# Patient Record
Sex: FEMALE | Race: WHITE | Employment: FULL TIME | URBAN - NONMETROPOLITAN AREA
[De-identification: names, ages, dates, MRNs, and addresses within clinical notes are randomized per-mention and may not be internally consistent; named-entity substitution may affect disease eponyms.]

---

## 2017-08-11 ENCOUNTER — APPOINTMENT (OUTPATIENT)
Dept: CT IMAGING | Age: 22
End: 2017-08-11
Payer: COMMERCIAL

## 2017-08-11 ENCOUNTER — ANESTHESIA EVENT (OUTPATIENT)
Dept: OPERATING ROOM | Age: 22
End: 2017-08-11
Payer: COMMERCIAL

## 2017-08-11 ENCOUNTER — HOSPITAL ENCOUNTER (OUTPATIENT)
Age: 22
Setting detail: OBSERVATION
Discharge: HOME | End: 2017-08-12
Attending: FAMILY MEDICINE | Admitting: SURGERY
Payer: COMMERCIAL

## 2017-08-11 ENCOUNTER — ANESTHESIA (OUTPATIENT)
Dept: OPERATING ROOM | Age: 22
End: 2017-08-11
Payer: COMMERCIAL

## 2017-08-11 VITALS
SYSTOLIC BLOOD PRESSURE: 102 MMHG | RESPIRATION RATE: 34 BRPM | OXYGEN SATURATION: 100 % | DIASTOLIC BLOOD PRESSURE: 59 MMHG

## 2017-08-11 DIAGNOSIS — K35.30 ACUTE APPENDICITIS WITH LOCALIZED PERITONITIS: Primary | ICD-10-CM

## 2017-08-11 LAB
ALBUMIN SERPL-MCNC: 3.8 GM/DL (ref 3.5–5)
ALP BLD-CCNC: 75 U/L (ref 46–116)
ALT SERPL-CCNC: 14 U/L (ref 12–78)
AMORPHOUS: ABNORMAL
ANION GAP: 8 MEQ/L (ref 8–16)
AST SERPL-CCNC: 10 U/L (ref 15–37)
BACTERIA: ABNORMAL
BASOPHILS # BLD: 0.7 % (ref 0–3)
BILIRUB SERPL-MCNC: 0.4 MG/DL (ref 0.2–1)
BILIRUBIN URINE: NEGATIVE
BLOOD, URINE: ABNORMAL
BUN BLDV-MCNC: 11 MG/DL (ref 7–18)
CASTS UA: ABNORMAL /LPF
CHARACTER, URINE: CLEAR
CHLORIDE BLD-SCNC: 106 MEQ/L (ref 98–107)
CO2: 24 MEQ/L (ref 21–32)
COLOR: COLORLESS
CREAT SERPL-MCNC: 0.8 MG/DL (ref 0.6–1.1)
CRYSTALS, UA: ABNORMAL
EOSINOPHILS RELATIVE PERCENT: 1.3 % (ref 0–4)
EPITHELIAL CELLS, UA: ABNORMAL /HPF
GFR, ESTIMATED: > 90 ML/MIN/1.73M2
GLUCOSE BLD-MCNC: 100 MG/DL (ref 74–106)
GLUCOSE, URINE: NEGATIVE MG/DL
HCT VFR BLD CALC: 41.1 % (ref 37–47)
HEMOGLOBIN: 13 GM/DL (ref 12–16)
KETONES, URINE: NEGATIVE
LEUKOCYTE ESTERASE, URINE: NEGATIVE
LIPASE: 89 U/L (ref 65–230)
LYMPHOCYTES # BLD: 20.6 % (ref 15–47)
MCH RBC QN AUTO: 26.8 PG (ref 27–31)
MCHC RBC AUTO-ENTMCNC: 31.6 GM/DL (ref 33–37)
MCV RBC AUTO: 84.8 FL (ref 81–99)
MONOCYTES: 7.2 % (ref 0–12)
MUCUS: ABNORMAL
NITRITE, URINE: NEGATIVE
PDW BLD-RTO: 13 % (ref 11.5–14.5)
PH UA: 5.5 (ref 5–9)
PLATELET # BLD: 274 THOU/MM3 (ref 130–400)
PLATELET ESTIMATE: ABNORMAL
PMV BLD AUTO: 7.7 MCM (ref 7.4–10.4)
POC CALCIUM: 8.9 MG/DL (ref 8.5–10.1)
POTASSIUM SERPL-SCNC: 3.8 MEQ/L (ref 3.5–5.1)
PREGNANCY, URINE: NEGATIVE
PROTEIN UA: NEGATIVE MG/DL
RBC # BLD: 4.85 MILL/MM3 (ref 4.2–5.4)
RBC UA: ABNORMAL /HPF
REFLEX TO URINE C & S: ABNORMAL
SEGS: 70.2 % (ref 43–75)
SODIUM BLD-SCNC: 138 MEQ/L (ref 136–145)
SPECIFIC GRAVITY UA: < 1.005 (ref 1–1.03)
TOTAL PROTEIN: 7.4 GM/DL (ref 6.4–8.2)
UROBILINOGEN, URINE: 0.2 EU/DL (ref 0–1)
WBC # BLD: 9.8 THOU/MM3 (ref 4.8–10.8)
WBC UA: ABNORMAL /HPF

## 2017-08-11 PROCEDURE — 7100000001 HC PACU RECOVERY - ADDTL 15 MIN: Performed by: SURGERY

## 2017-08-11 PROCEDURE — 96374 THER/PROPH/DIAG INJ IV PUSH: CPT

## 2017-08-11 PROCEDURE — 96375 TX/PRO/DX INJ NEW DRUG ADDON: CPT

## 2017-08-11 PROCEDURE — 88304 TISSUE EXAM BY PATHOLOGIST: CPT

## 2017-08-11 PROCEDURE — 96365 THER/PROPH/DIAG IV INF INIT: CPT

## 2017-08-11 PROCEDURE — 99285 EMERGENCY DEPT VISIT HI MDM: CPT

## 2017-08-11 PROCEDURE — G0378 HOSPITAL OBSERVATION PER HR: HCPCS

## 2017-08-11 PROCEDURE — 3600000013 HC SURGERY LEVEL 3 ADDTL 15MIN: Performed by: SURGERY

## 2017-08-11 PROCEDURE — 2720000010 HC SURG SUPPLY STERILE: Performed by: SURGERY

## 2017-08-11 PROCEDURE — 83690 ASSAY OF LIPASE: CPT

## 2017-08-11 PROCEDURE — 7100000000 HC PACU RECOVERY - FIRST 15 MIN: Performed by: SURGERY

## 2017-08-11 PROCEDURE — A6447 CONFORM BAND S W >=5"/YD: HCPCS

## 2017-08-11 PROCEDURE — 6360000002 HC RX W HCPCS: Performed by: NURSE ANESTHETIST, CERTIFIED REGISTERED

## 2017-08-11 PROCEDURE — 6370000000 HC RX 637 (ALT 250 FOR IP): Performed by: SURGERY

## 2017-08-11 PROCEDURE — 2580000003 HC RX 258: Performed by: FAMILY MEDICINE

## 2017-08-11 PROCEDURE — 84703 CHORIONIC GONADOTROPIN ASSAY: CPT

## 2017-08-11 PROCEDURE — 6360000002 HC RX W HCPCS: Performed by: SURGERY

## 2017-08-11 PROCEDURE — 44970 LAPAROSCOPY APPENDECTOMY: CPT | Performed by: SURGERY

## 2017-08-11 PROCEDURE — 2720000003 HC MISC SUTURE/STAPLES/RELOADS/ETC: Performed by: SURGERY

## 2017-08-11 PROCEDURE — 99219 PR INITIAL OBSERVATION CARE/DAY 50 MINUTES: CPT | Performed by: SURGERY

## 2017-08-11 PROCEDURE — 74177 CT ABD & PELVIS W/CONTRAST: CPT

## 2017-08-11 PROCEDURE — 3600000003 HC SURGERY LEVEL 3 BASE: Performed by: SURGERY

## 2017-08-11 PROCEDURE — 96361 HYDRATE IV INFUSION ADD-ON: CPT

## 2017-08-11 PROCEDURE — 2500000003 HC RX 250 WO HCPCS: Performed by: NURSE ANESTHETIST, CERTIFIED REGISTERED

## 2017-08-11 PROCEDURE — 96376 TX/PRO/DX INJ SAME DRUG ADON: CPT

## 2017-08-11 PROCEDURE — 85025 COMPLETE CBC W/AUTO DIFF WBC: CPT

## 2017-08-11 PROCEDURE — 3700000001 HC ADD 15 MINUTES (ANESTHESIA): Performed by: SURGERY

## 2017-08-11 PROCEDURE — 6360000004 HC RX CONTRAST MEDICATION: Performed by: FAMILY MEDICINE

## 2017-08-11 PROCEDURE — 96366 THER/PROPH/DIAG IV INF ADDON: CPT

## 2017-08-11 PROCEDURE — 2500000003 HC RX 250 WO HCPCS: Performed by: SURGERY

## 2017-08-11 PROCEDURE — 81001 URINALYSIS AUTO W/SCOPE: CPT

## 2017-08-11 PROCEDURE — 36415 COLL VENOUS BLD VENIPUNCTURE: CPT

## 2017-08-11 PROCEDURE — 2580000003 HC RX 258: Performed by: SURGERY

## 2017-08-11 PROCEDURE — 6360000002 HC RX W HCPCS

## 2017-08-11 PROCEDURE — 80053 COMPREHEN METABOLIC PANEL: CPT

## 2017-08-11 PROCEDURE — 3700000000 HC ANESTHESIA ATTENDED CARE: Performed by: SURGERY

## 2017-08-11 RX ORDER — FENTANYL CITRATE 50 UG/ML
25 INJECTION, SOLUTION INTRAMUSCULAR; INTRAVENOUS EVERY 5 MIN PRN
Status: DISCONTINUED | OUTPATIENT
Start: 2017-08-11 | End: 2017-08-11 | Stop reason: HOSPADM

## 2017-08-11 RX ORDER — SODIUM CHLORIDE 0.9 % (FLUSH) 0.9 %
10 SYRINGE (ML) INJECTION PRN
Status: DISCONTINUED | OUTPATIENT
Start: 2017-08-11 | End: 2017-08-11 | Stop reason: SDUPTHER

## 2017-08-11 RX ORDER — HYDROMORPHONE HCL 110MG/55ML
PATIENT CONTROLLED ANALGESIA SYRINGE INTRAVENOUS PRN
Status: DISCONTINUED | OUTPATIENT
Start: 2017-08-11 | End: 2017-08-11 | Stop reason: SDUPTHER

## 2017-08-11 RX ORDER — LABETALOL HYDROCHLORIDE 5 MG/ML
5 INJECTION, SOLUTION INTRAVENOUS EVERY 10 MIN PRN
Status: DISCONTINUED | OUTPATIENT
Start: 2017-08-11 | End: 2017-08-11 | Stop reason: HOSPADM

## 2017-08-11 RX ORDER — 0.9 % SODIUM CHLORIDE 0.9 %
1000 INTRAVENOUS SOLUTION INTRAVENOUS ONCE
Status: COMPLETED | OUTPATIENT
Start: 2017-08-11 | End: 2017-08-11

## 2017-08-11 RX ORDER — NEOSTIGMINE METHYLSULFATE 1 MG/ML
INJECTION, SOLUTION INTRAVENOUS PRN
Status: DISCONTINUED | OUTPATIENT
Start: 2017-08-11 | End: 2017-08-11 | Stop reason: SDUPTHER

## 2017-08-11 RX ORDER — LIDOCAINE HYDROCHLORIDE 20 MG/ML
INJECTION, SOLUTION INFILTRATION; PERINEURAL PRN
Status: DISCONTINUED | OUTPATIENT
Start: 2017-08-11 | End: 2017-08-11 | Stop reason: SDUPTHER

## 2017-08-11 RX ORDER — DIPHENHYDRAMINE HYDROCHLORIDE 50 MG/ML
12.5 INJECTION INTRAMUSCULAR; INTRAVENOUS
Status: DISCONTINUED | OUTPATIENT
Start: 2017-08-11 | End: 2017-08-11 | Stop reason: HOSPADM

## 2017-08-11 RX ORDER — PROMETHAZINE HYDROCHLORIDE 25 MG/ML
12.5 INJECTION, SOLUTION INTRAMUSCULAR; INTRAVENOUS
Status: DISCONTINUED | OUTPATIENT
Start: 2017-08-11 | End: 2017-08-11 | Stop reason: HOSPADM

## 2017-08-11 RX ORDER — DEXAMETHASONE SODIUM PHOSPHATE 4 MG/ML
INJECTION, SOLUTION INTRA-ARTICULAR; INTRALESIONAL; INTRAMUSCULAR; INTRAVENOUS; SOFT TISSUE PRN
Status: DISCONTINUED | OUTPATIENT
Start: 2017-08-11 | End: 2017-08-11 | Stop reason: SDUPTHER

## 2017-08-11 RX ORDER — OXYCODONE HYDROCHLORIDE AND ACETAMINOPHEN 5; 325 MG/1; MG/1
2 TABLET ORAL EVERY 4 HOURS PRN
Status: DISCONTINUED | OUTPATIENT
Start: 2017-08-11 | End: 2017-08-12 | Stop reason: HOSPADM

## 2017-08-11 RX ORDER — SUCCINYLCHOLINE CHLORIDE 20 MG/ML
INJECTION INTRAMUSCULAR; INTRAVENOUS PRN
Status: DISCONTINUED | OUTPATIENT
Start: 2017-08-11 | End: 2017-08-11 | Stop reason: SDUPTHER

## 2017-08-11 RX ORDER — FENTANYL CITRATE 50 UG/ML
50 INJECTION, SOLUTION INTRAMUSCULAR; INTRAVENOUS EVERY 5 MIN PRN
Status: DISCONTINUED | OUTPATIENT
Start: 2017-08-11 | End: 2017-08-11 | Stop reason: HOSPADM

## 2017-08-11 RX ORDER — BUPIVACAINE HYDROCHLORIDE AND EPINEPHRINE 5; 5 MG/ML; UG/ML
INJECTION, SOLUTION EPIDURAL; INTRACAUDAL; PERINEURAL PRN
Status: DISCONTINUED | OUTPATIENT
Start: 2017-08-11 | End: 2017-08-11 | Stop reason: HOSPADM

## 2017-08-11 RX ORDER — SODIUM CHLORIDE 0.9 % (FLUSH) 0.9 %
10 SYRINGE (ML) INJECTION PRN
Status: DISCONTINUED | OUTPATIENT
Start: 2017-08-11 | End: 2017-08-12 | Stop reason: HOSPADM

## 2017-08-11 RX ORDER — ONDANSETRON 2 MG/ML
INJECTION INTRAMUSCULAR; INTRAVENOUS PRN
Status: DISCONTINUED | OUTPATIENT
Start: 2017-08-11 | End: 2017-08-11 | Stop reason: SDUPTHER

## 2017-08-11 RX ORDER — SODIUM CHLORIDE 0.9 % (FLUSH) 0.9 %
10 SYRINGE (ML) INJECTION EVERY 12 HOURS SCHEDULED
Status: DISCONTINUED | OUTPATIENT
Start: 2017-08-11 | End: 2017-08-11 | Stop reason: SDUPTHER

## 2017-08-11 RX ORDER — MIDAZOLAM HYDROCHLORIDE 1 MG/ML
INJECTION INTRAMUSCULAR; INTRAVENOUS PRN
Status: DISCONTINUED | OUTPATIENT
Start: 2017-08-11 | End: 2017-08-11 | Stop reason: SDUPTHER

## 2017-08-11 RX ORDER — ONDANSETRON 2 MG/ML
4 INJECTION INTRAMUSCULAR; INTRAVENOUS EVERY 6 HOURS PRN
Status: DISCONTINUED | OUTPATIENT
Start: 2017-08-11 | End: 2017-08-11 | Stop reason: SDUPTHER

## 2017-08-11 RX ORDER — FENTANYL CITRATE 50 UG/ML
INJECTION, SOLUTION INTRAMUSCULAR; INTRAVENOUS PRN
Status: DISCONTINUED | OUTPATIENT
Start: 2017-08-11 | End: 2017-08-11 | Stop reason: SDUPTHER

## 2017-08-11 RX ORDER — GLYCOPYRROLATE 0.2 MG/ML
INJECTION INTRAMUSCULAR; INTRAVENOUS PRN
Status: DISCONTINUED | OUTPATIENT
Start: 2017-08-11 | End: 2017-08-11 | Stop reason: SDUPTHER

## 2017-08-11 RX ORDER — MEPERIDINE HYDROCHLORIDE 25 MG/ML
25 INJECTION INTRAMUSCULAR; INTRAVENOUS; SUBCUTANEOUS
Status: DISCONTINUED | OUTPATIENT
Start: 2017-08-11 | End: 2017-08-11 | Stop reason: HOSPADM

## 2017-08-11 RX ORDER — SODIUM CHLORIDE 0.9 % (FLUSH) 0.9 %
10 SYRINGE (ML) INJECTION EVERY 12 HOURS SCHEDULED
Status: DISCONTINUED | OUTPATIENT
Start: 2017-08-11 | End: 2017-08-12 | Stop reason: HOSPADM

## 2017-08-11 RX ORDER — OXYCODONE HYDROCHLORIDE AND ACETAMINOPHEN 5; 325 MG/1; MG/1
1 TABLET ORAL EVERY 4 HOURS PRN
Status: DISCONTINUED | OUTPATIENT
Start: 2017-08-11 | End: 2017-08-12 | Stop reason: HOSPADM

## 2017-08-11 RX ORDER — ONDANSETRON 2 MG/ML
4 INJECTION INTRAMUSCULAR; INTRAVENOUS EVERY 6 HOURS PRN
Status: DISCONTINUED | OUTPATIENT
Start: 2017-08-11 | End: 2017-08-12 | Stop reason: HOSPADM

## 2017-08-11 RX ORDER — ONDANSETRON 2 MG/ML
INJECTION INTRAMUSCULAR; INTRAVENOUS
Status: COMPLETED
Start: 2017-08-11 | End: 2017-08-11

## 2017-08-11 RX ORDER — ROCURONIUM BROMIDE 10 MG/ML
INJECTION, SOLUTION INTRAVENOUS PRN
Status: DISCONTINUED | OUTPATIENT
Start: 2017-08-11 | End: 2017-08-11 | Stop reason: SDUPTHER

## 2017-08-11 RX ORDER — ACETAMINOPHEN 325 MG/1
650 TABLET ORAL EVERY 4 HOURS PRN
Status: DISCONTINUED | OUTPATIENT
Start: 2017-08-11 | End: 2017-08-12 | Stop reason: HOSPADM

## 2017-08-11 RX ORDER — ONDANSETRON 2 MG/ML
4 INJECTION INTRAMUSCULAR; INTRAVENOUS ONCE
Status: COMPLETED | OUTPATIENT
Start: 2017-08-11 | End: 2017-08-11

## 2017-08-11 RX ORDER — PROPOFOL 10 MG/ML
INJECTION, EMULSION INTRAVENOUS PRN
Status: DISCONTINUED | OUTPATIENT
Start: 2017-08-11 | End: 2017-08-11 | Stop reason: SDUPTHER

## 2017-08-11 RX ORDER — ACETAMINOPHEN 325 MG/1
650 TABLET ORAL EVERY 4 HOURS PRN
Status: DISCONTINUED | OUTPATIENT
Start: 2017-08-11 | End: 2017-08-11 | Stop reason: SDUPTHER

## 2017-08-11 RX ORDER — SODIUM CHLORIDE 9 MG/ML
INJECTION, SOLUTION INTRAVENOUS CONTINUOUS
Status: DISCONTINUED | OUTPATIENT
Start: 2017-08-11 | End: 2017-08-12

## 2017-08-11 RX ADMIN — FENTANYL CITRATE 100 MCG: 50 INJECTION INTRAMUSCULAR; INTRAVENOUS at 10:00

## 2017-08-11 RX ADMIN — HYDROMORPHONE HYDROCHLORIDE 0.25 MG: 1 INJECTION, SOLUTION INTRAMUSCULAR; INTRAVENOUS; SUBCUTANEOUS at 16:21

## 2017-08-11 RX ADMIN — ONDANSETRON 4 MG: 2 INJECTION INTRAMUSCULAR; INTRAVENOUS at 10:07

## 2017-08-11 RX ADMIN — SODIUM CHLORIDE 1000 ML: 9 INJECTION, SOLUTION INTRAVENOUS at 02:35

## 2017-08-11 RX ADMIN — NEOSTIGMINE METHYLSULFATE 3 MG: 1 INJECTION, SOLUTION INTRAVENOUS at 10:28

## 2017-08-11 RX ADMIN — IOPAMIDOL 100 ML: 755 INJECTION, SOLUTION INTRAVENOUS at 03:56

## 2017-08-11 RX ADMIN — DEXAMETHASONE SODIUM PHOSPHATE 8 MG: 4 INJECTION, SOLUTION INTRAMUSCULAR; INTRAVENOUS at 10:07

## 2017-08-11 RX ADMIN — OXYCODONE HYDROCHLORIDE AND ACETAMINOPHEN 2 TABLET: 5; 325 TABLET ORAL at 17:58

## 2017-08-11 RX ADMIN — SUCCINYLCHOLINE CHLORIDE 100 MG: 20 INJECTION, SOLUTION INTRAMUSCULAR; INTRAVENOUS at 10:00

## 2017-08-11 RX ADMIN — GLYCOPYRROLATE 0.4 MG: 0.2 INJECTION, SOLUTION INTRAMUSCULAR; INTRAVENOUS at 10:28

## 2017-08-11 RX ADMIN — MIDAZOLAM HYDROCHLORIDE 2 MG: 1 INJECTION INTRAMUSCULAR; INTRAVENOUS at 09:47

## 2017-08-11 RX ADMIN — HYDROMORPHONE HYDROCHLORIDE 0.5 MG: 1 INJECTION, SOLUTION INTRAMUSCULAR; INTRAVENOUS; SUBCUTANEOUS at 08:24

## 2017-08-11 RX ADMIN — ONDANSETRON 4 MG: 2 INJECTION INTRAMUSCULAR; INTRAVENOUS at 05:12

## 2017-08-11 RX ADMIN — PIPERACILLIN SODIUM,TAZOBACTAM SODIUM 3.38 G: 3; .375 INJECTION, POWDER, FOR SOLUTION INTRAVENOUS at 16:22

## 2017-08-11 RX ADMIN — PIPERACILLIN SODIUM,TAZOBACTAM SODIUM 3.38 G: 3; .375 INJECTION, POWDER, FOR SOLUTION INTRAVENOUS at 09:13

## 2017-08-11 RX ADMIN — GLYCOPYRROLATE 0.2 MG: 0.2 INJECTION, SOLUTION INTRAMUSCULAR; INTRAVENOUS at 10:05

## 2017-08-11 RX ADMIN — OXYCODONE HYDROCHLORIDE AND ACETAMINOPHEN 1 TABLET: 5; 325 TABLET ORAL at 22:07

## 2017-08-11 RX ADMIN — LIDOCAINE HYDROCHLORIDE 60 MG: 20 INJECTION, SOLUTION INFILTRATION; PERINEURAL at 10:00

## 2017-08-11 RX ADMIN — SODIUM CHLORIDE: 9 INJECTION, SOLUTION INTRAVENOUS at 12:50

## 2017-08-11 RX ADMIN — HYDROMORPHONE HYDROCHLORIDE 1 MG: 2 INJECTION INTRAMUSCULAR; INTRAVENOUS; SUBCUTANEOUS at 10:26

## 2017-08-11 RX ADMIN — PROPOFOL 200 MG: 10 INJECTION, EMULSION INTRAVENOUS at 10:00

## 2017-08-11 RX ADMIN — Medication 30 MG: at 10:05

## 2017-08-11 RX ADMIN — SODIUM CHLORIDE: 9 INJECTION, SOLUTION INTRAVENOUS at 08:20

## 2017-08-11 RX ADMIN — HYDROMORPHONE HYDROCHLORIDE 0.25 MG: 1 INJECTION, SOLUTION INTRAMUSCULAR; INTRAVENOUS; SUBCUTANEOUS at 12:48

## 2017-08-11 ASSESSMENT — PAIN SCALES - GENERAL
PAINLEVEL_OUTOF10: 5
PAINLEVEL_OUTOF10: 5
PAINLEVEL_OUTOF10: 3
PAINLEVEL_OUTOF10: 5
PAINLEVEL_OUTOF10: 0
PAINLEVEL_OUTOF10: 6
PAINLEVEL_OUTOF10: 5
PAINLEVEL_OUTOF10: 6
PAINLEVEL_OUTOF10: 5
PAINLEVEL_OUTOF10: 7
PAINLEVEL_OUTOF10: 5
PAINLEVEL_OUTOF10: 3
PAINLEVEL_OUTOF10: 0
PAINLEVEL_OUTOF10: 5
PAINLEVEL_OUTOF10: 0
PAINLEVEL_OUTOF10: 6
PAINLEVEL_OUTOF10: 5
PAINLEVEL_OUTOF10: 4
PAINLEVEL_OUTOF10: 8
PAINLEVEL_OUTOF10: 6

## 2017-08-11 ASSESSMENT — PAIN DESCRIPTION - PAIN TYPE
TYPE: ACUTE PAIN
TYPE: SURGICAL PAIN
TYPE: SURGICAL PAIN
TYPE: ACUTE PAIN
TYPE: SURGICAL PAIN
TYPE: ACUTE PAIN

## 2017-08-11 ASSESSMENT — ENCOUNTER SYMPTOMS
SORE THROAT: 0
WHEEZING: 0
EYE DISCHARGE: 0
COLOR CHANGE: 0
CHEST TIGHTNESS: 0
EYE REDNESS: 0
ABDOMINAL DISTENTION: 0
STRIDOR: 0
PHOTOPHOBIA: 0
SHORTNESS OF BREATH: 0
CONSTIPATION: 0
VOMITING: 0
COUGH: 0
RHINORRHEA: 0
VOICE CHANGE: 0
EYE PAIN: 0
BACK PAIN: 0
FACIAL SWELLING: 0
DIARRHEA: 0
ABDOMINAL PAIN: 1
NAUSEA: 1

## 2017-08-11 ASSESSMENT — PULMONARY FUNCTION TESTS
PIF_VALUE: 15
PIF_VALUE: 21
PIF_VALUE: 0
PIF_VALUE: 20
PIF_VALUE: 15
PIF_VALUE: 15
PIF_VALUE: 14
PIF_VALUE: 14
PIF_VALUE: 0
PIF_VALUE: 27
PIF_VALUE: 14
PIF_VALUE: 24
PIF_VALUE: 3
PIF_VALUE: 15
PIF_VALUE: 15
PIF_VALUE: 0
PIF_VALUE: 3
PIF_VALUE: 22
PIF_VALUE: 10
PIF_VALUE: 2
PIF_VALUE: 10
PIF_VALUE: 10
PIF_VALUE: 15
PIF_VALUE: 11
PIF_VALUE: 16
PIF_VALUE: 11
PIF_VALUE: 2
PIF_VALUE: 11
PIF_VALUE: 0
PIF_VALUE: 0
PIF_VALUE: 23
PIF_VALUE: 19
PIF_VALUE: 22
PIF_VALUE: 15
PIF_VALUE: 0
PIF_VALUE: 21
PIF_VALUE: 15
PIF_VALUE: 18
PIF_VALUE: 14
PIF_VALUE: 11
PIF_VALUE: 21
PIF_VALUE: 24
PIF_VALUE: 22
PIF_VALUE: 10
PIF_VALUE: 7
PIF_VALUE: 7
PIF_VALUE: 11
PIF_VALUE: 21
PIF_VALUE: 21

## 2017-08-11 ASSESSMENT — PAIN DESCRIPTION - LOCATION
LOCATION: ABDOMEN

## 2017-08-11 ASSESSMENT — PAIN DESCRIPTION - DESCRIPTORS
DESCRIPTORS: ACHING

## 2017-08-11 ASSESSMENT — PAIN DESCRIPTION - ONSET
ONSET: AWAKENED FROM SLEEP
ONSET: ON-GOING

## 2017-08-11 ASSESSMENT — PAIN SCALES - WONG BAKER: WONGBAKER_NUMERICALRESPONSE: 0

## 2017-08-11 ASSESSMENT — PAIN DESCRIPTION - ORIENTATION
ORIENTATION: RIGHT
ORIENTATION: RIGHT;MID;LOWER
ORIENTATION: RIGHT;MID

## 2017-08-11 ASSESSMENT — PAIN DESCRIPTION - PROGRESSION
CLINICAL_PROGRESSION: NOT CHANGED
CLINICAL_PROGRESSION: NOT CHANGED

## 2017-08-11 ASSESSMENT — PAIN DESCRIPTION - FREQUENCY
FREQUENCY: CONTINUOUS
FREQUENCY: INTERMITTENT
FREQUENCY: INTERMITTENT

## 2017-08-12 VITALS
BODY MASS INDEX: 28.44 KG/M2 | TEMPERATURE: 98.3 F | OXYGEN SATURATION: 98 % | DIASTOLIC BLOOD PRESSURE: 68 MMHG | HEIGHT: 67 IN | RESPIRATION RATE: 16 BRPM | WEIGHT: 181.2 LBS | HEART RATE: 72 BPM | SYSTOLIC BLOOD PRESSURE: 110 MMHG

## 2017-08-12 LAB
ANION GAP SERPL CALCULATED.3IONS-SCNC: 13 MEQ/L (ref 8–16)
BUN BLDV-MCNC: 6 MG/DL (ref 7–22)
CALCIUM SERPL-MCNC: 8.7 MG/DL (ref 8.5–10.5)
CHLORIDE BLD-SCNC: 104 MEQ/L (ref 98–111)
CO2: 23 MEQ/L (ref 23–33)
CREAT SERPL-MCNC: 0.7 MG/DL (ref 0.4–1.2)
GFR SERPL CREATININE-BSD FRML MDRD: > 90 ML/MIN/1.73M2
GLUCOSE BLD-MCNC: 112 MG/DL (ref 70–108)
HCT VFR BLD CALC: 35.9 % (ref 37–47)
HEMOGLOBIN: 12.1 GM/DL (ref 12–16)
MCH RBC QN AUTO: 27.8 PG (ref 27–31)
MCHC RBC AUTO-ENTMCNC: 33.7 GM/DL (ref 33–37)
MCV RBC AUTO: 82.4 FL (ref 81–99)
PDW BLD-RTO: 14.6 % (ref 11.5–14.5)
PLATELET # BLD: 266 THOU/MM3 (ref 130–400)
PMV BLD AUTO: 8.4 MCM (ref 7.4–10.4)
POTASSIUM SERPL-SCNC: 4.8 MEQ/L (ref 3.5–5.2)
RBC # BLD: 4.36 MILL/MM3 (ref 4.2–5.4)
SODIUM BLD-SCNC: 140 MEQ/L (ref 135–145)
WBC # BLD: 9.6 THOU/MM3 (ref 4.8–10.8)

## 2017-08-12 PROCEDURE — 6360000002 HC RX W HCPCS: Performed by: SURGERY

## 2017-08-12 PROCEDURE — 36415 COLL VENOUS BLD VENIPUNCTURE: CPT

## 2017-08-12 PROCEDURE — 96366 THER/PROPH/DIAG IV INF ADDON: CPT

## 2017-08-12 PROCEDURE — 6370000000 HC RX 637 (ALT 250 FOR IP): Performed by: SURGERY

## 2017-08-12 PROCEDURE — 85027 COMPLETE CBC AUTOMATED: CPT

## 2017-08-12 PROCEDURE — 2580000003 HC RX 258: Performed by: SURGERY

## 2017-08-12 PROCEDURE — 99999 PR OFFICE/OUTPT VISIT,PROCEDURE ONLY: CPT | Performed by: NURSE PRACTITIONER

## 2017-08-12 PROCEDURE — 99024 POSTOP FOLLOW-UP VISIT: CPT | Performed by: NURSE PRACTITIONER

## 2017-08-12 PROCEDURE — G0378 HOSPITAL OBSERVATION PER HR: HCPCS

## 2017-08-12 PROCEDURE — 80048 BASIC METABOLIC PNL TOTAL CA: CPT

## 2017-08-12 RX ORDER — OXYCODONE HYDROCHLORIDE AND ACETAMINOPHEN 5; 325 MG/1; MG/1
1 TABLET ORAL EVERY 4 HOURS PRN
Qty: 20 TABLET | Refills: 0 | Status: SHIPPED | OUTPATIENT
Start: 2017-08-12 | End: 2017-08-19

## 2017-08-12 RX ADMIN — OXYCODONE HYDROCHLORIDE AND ACETAMINOPHEN 1 TABLET: 5; 325 TABLET ORAL at 13:27

## 2017-08-12 RX ADMIN — PIPERACILLIN SODIUM,TAZOBACTAM SODIUM 3.38 G: 3; .375 INJECTION, POWDER, FOR SOLUTION INTRAVENOUS at 09:09

## 2017-08-12 RX ADMIN — PIPERACILLIN SODIUM,TAZOBACTAM SODIUM 3.38 G: 3; .375 INJECTION, POWDER, FOR SOLUTION INTRAVENOUS at 01:25

## 2017-08-12 RX ADMIN — OXYCODONE HYDROCHLORIDE AND ACETAMINOPHEN 1 TABLET: 5; 325 TABLET ORAL at 09:09

## 2017-08-12 RX ADMIN — SODIUM CHLORIDE: 9 INJECTION, SOLUTION INTRAVENOUS at 01:25

## 2017-08-12 ASSESSMENT — PAIN SCALES - GENERAL
PAINLEVEL_OUTOF10: 0
PAINLEVEL_OUTOF10: 1
PAINLEVEL_OUTOF10: 5
PAINLEVEL_OUTOF10: 4
PAINLEVEL_OUTOF10: 4
PAINLEVEL_OUTOF10: 5

## 2017-08-12 ASSESSMENT — PAIN DESCRIPTION - DESCRIPTORS
DESCRIPTORS: ACHING;SORE
DESCRIPTORS: ACHING;SORE

## 2017-08-12 ASSESSMENT — PAIN DESCRIPTION - ONSET
ONSET: ON-GOING
ONSET: ON-GOING

## 2017-08-12 ASSESSMENT — PAIN DESCRIPTION - ORIENTATION
ORIENTATION: RIGHT;LOWER;MID
ORIENTATION: RIGHT;MID;LOWER

## 2017-08-12 ASSESSMENT — PAIN DESCRIPTION - PROGRESSION
CLINICAL_PROGRESSION: RAPIDLY IMPROVING
CLINICAL_PROGRESSION: GRADUALLY IMPROVING

## 2017-08-12 ASSESSMENT — PAIN DESCRIPTION - PAIN TYPE
TYPE: SURGICAL PAIN
TYPE: SURGICAL PAIN

## 2017-08-12 ASSESSMENT — PAIN DESCRIPTION - LOCATION
LOCATION: ABDOMEN
LOCATION: ABDOMEN

## 2017-08-12 ASSESSMENT — PAIN DESCRIPTION - FREQUENCY
FREQUENCY: CONTINUOUS
FREQUENCY: CONTINUOUS

## 2017-08-30 ENCOUNTER — OFFICE VISIT (OUTPATIENT)
Dept: SURGERY | Age: 22
End: 2017-08-30

## 2017-08-30 VITALS
DIASTOLIC BLOOD PRESSURE: 58 MMHG | BODY MASS INDEX: 27.64 KG/M2 | OXYGEN SATURATION: 98 % | SYSTOLIC BLOOD PRESSURE: 100 MMHG | HEART RATE: 112 BPM | HEIGHT: 67 IN | RESPIRATION RATE: 18 BRPM | WEIGHT: 176.1 LBS | TEMPERATURE: 97.3 F

## 2017-08-30 DIAGNOSIS — Z90.49 S/P LAPAROSCOPIC APPENDECTOMY: Primary | ICD-10-CM

## 2017-08-30 PROCEDURE — 99024 POSTOP FOLLOW-UP VISIT: CPT | Performed by: NURSE PRACTITIONER

## 2017-08-30 ASSESSMENT — ENCOUNTER SYMPTOMS
CHOKING: 0
DIARRHEA: 0
ABDOMINAL PAIN: 0
COUGH: 0
ANAL BLEEDING: 0
RHINORRHEA: 0
CHEST TIGHTNESS: 0
BLOOD IN STOOL: 0
EYE ITCHING: 0
SORE THROAT: 0
WHEEZING: 0
PHOTOPHOBIA: 0
EYE PAIN: 0
STRIDOR: 0
BACK PAIN: 0
NAUSEA: 0
VOICE CHANGE: 0
TROUBLE SWALLOWING: 0
CONSTIPATION: 0
COLOR CHANGE: 0
EYE DISCHARGE: 0
SHORTNESS OF BREATH: 0
SINUS PRESSURE: 0
APNEA: 0
FACIAL SWELLING: 0
RECTAL PAIN: 0
VOMITING: 0
EYE REDNESS: 0
ABDOMINAL DISTENTION: 0

## 2022-09-19 ENCOUNTER — HOSPITAL ENCOUNTER (EMERGENCY)
Age: 27
Discharge: HOME | End: 2022-09-19
Attending: EMERGENCY MEDICINE
Payer: COMMERCIAL

## 2022-09-19 ENCOUNTER — APPOINTMENT (OUTPATIENT)
Dept: GENERAL RADIOLOGY | Age: 27
End: 2022-09-19
Payer: COMMERCIAL

## 2022-09-19 VITALS
WEIGHT: 215 LBS | BODY MASS INDEX: 34.55 KG/M2 | SYSTOLIC BLOOD PRESSURE: 105 MMHG | TEMPERATURE: 98.3 F | HEART RATE: 71 BPM | DIASTOLIC BLOOD PRESSURE: 71 MMHG | HEIGHT: 66 IN | OXYGEN SATURATION: 99 % | RESPIRATION RATE: 23 BRPM

## 2022-09-19 DIAGNOSIS — R07.89 CHEST WALL PAIN: Primary | ICD-10-CM

## 2022-09-19 LAB
ABSOLUTE IMMATURE GRANULOCYTE: 0.01 THOU/MM3 (ref 0–0.07)
ALBUMIN SERPL-MCNC: 3.6 GM/DL (ref 3.4–5)
ALP BLD-CCNC: 87 U/L (ref 46–116)
ALT SERPL-CCNC: 21 U/L (ref 14–63)
ANION GAP: 8 MEQ/L (ref 8–16)
AST SERPL-CCNC: 14 U/L (ref 15–37)
BASOPHILS # BLD: 0.9 % (ref 0–3)
BASOPHILS ABSOLUTE: 0.1 THOU/MM3 (ref 0–0.1)
BILIRUB SERPL-MCNC: 0.4 MG/DL (ref 0.2–1)
BUN BLDV-MCNC: 17 MG/DL (ref 7–18)
CHLORIDE BLD-SCNC: 106 MEQ/L (ref 98–107)
CO2: 27 MEQ/L (ref 21–32)
CREAT SERPL-MCNC: 0.9 MG/DL (ref 0.6–1.3)
EOSINOPHILS ABSOLUTE: 0.2 THOU/MM3 (ref 0–0.5)
EOSINOPHILS RELATIVE PERCENT: 2.6 % (ref 0–4)
GFR, ESTIMATED: 81 ML/MIN/1.73M2
GLUCOSE BLD-MCNC: 98 MG/DL (ref 74–106)
HCT VFR BLD CALC: 40.5 % (ref 37–47)
HEMOGLOBIN: 13.7 GM/DL (ref 12–16)
IMMATURE GRANULOCYTES: 0 %
LYMPHOCYTES # BLD: 33 % (ref 15–47)
LYMPHOCYTES ABSOLUTE: 1.9 THOU/MM3 (ref 1–4.8)
MCH RBC QN AUTO: 29.8 PG (ref 26–32)
MCHC RBC AUTO-ENTMCNC: 33.8 GM/DL (ref 31–35)
MCV RBC AUTO: 88.2 FL (ref 81–99)
MONOCYTES: 0.5 THOU/MM3 (ref 0.3–1.3)
MONOCYTES: 7.8 % (ref 0–12)
NEUTROPHILS ABSOLUTE: 3.2 THOU/MM3 (ref 1.8–7.7)
PDW BLD-RTO: 12.4 % (ref 11.5–14.9)
PLATELET # BLD: 253 THOU/MM3 (ref 130–400)
PMV BLD AUTO: 9.5 FL (ref 9.4–12.4)
POC CALCIUM: 8.7 MG/DL (ref 8.5–10.1)
POTASSIUM SERPL-SCNC: 4.6 MEQ/L (ref 3.5–5.1)
RBC # BLD: 4.59 MILL/MM3 (ref 4.1–5.3)
SEGS: 55.5 % (ref 43–75)
SODIUM BLD-SCNC: 141 MEQ/L (ref 136–145)
TOTAL PROTEIN: 7.1 GM/DL (ref 6.4–8.2)
TROPONIN, HIGH SENSITIVITY: 5 PG/ML (ref 0–51.3)
WBC # BLD: 5.8 THOU/MM3 (ref 4.8–10.8)

## 2022-09-19 PROCEDURE — 71046 X-RAY EXAM CHEST 2 VIEWS: CPT

## 2022-09-19 PROCEDURE — 93005 ELECTROCARDIOGRAM TRACING: CPT | Performed by: EMERGENCY MEDICINE

## 2022-09-19 PROCEDURE — 85025 COMPLETE CBC W/AUTO DIFF WBC: CPT

## 2022-09-19 PROCEDURE — 80053 COMPREHEN METABOLIC PANEL: CPT

## 2022-09-19 PROCEDURE — 6370000000 HC RX 637 (ALT 250 FOR IP): Performed by: EMERGENCY MEDICINE

## 2022-09-19 PROCEDURE — 84484 ASSAY OF TROPONIN QUANT: CPT

## 2022-09-19 PROCEDURE — 99285 EMERGENCY DEPT VISIT HI MDM: CPT

## 2022-09-19 PROCEDURE — 36415 COLL VENOUS BLD VENIPUNCTURE: CPT

## 2022-09-19 RX ORDER — ASPIRIN 81 MG/1
324 TABLET, CHEWABLE ORAL ONCE
Status: COMPLETED | OUTPATIENT
Start: 2022-09-19 | End: 2022-09-19

## 2022-09-19 RX ORDER — IBUPROFEN 200 MG
600 TABLET ORAL ONCE
Status: COMPLETED | OUTPATIENT
Start: 2022-09-19 | End: 2022-09-19

## 2022-09-19 RX ADMIN — IBUPROFEN 600 MG: 200 TABLET, FILM COATED ORAL at 09:19

## 2022-09-19 RX ADMIN — ASPIRIN 81 MG 324 MG: 81 TABLET ORAL at 08:55

## 2022-09-19 ASSESSMENT — PAIN SCALES - GENERAL
PAINLEVEL_OUTOF10: 0
PAINLEVEL_OUTOF10: 1
PAINLEVEL_OUTOF10: 0

## 2022-09-19 ASSESSMENT — ENCOUNTER SYMPTOMS
COUGH: 0
WHEEZING: 0
HEARTBURN: 0
NAUSEA: 0
ABDOMINAL PAIN: 0
SORE THROAT: 0
SHORTNESS OF BREATH: 0

## 2022-09-19 ASSESSMENT — PAIN DESCRIPTION - ORIENTATION: ORIENTATION: MID;UPPER;LEFT

## 2022-09-19 ASSESSMENT — HEART SCORE: ECG: 0

## 2022-09-19 ASSESSMENT — PAIN DESCRIPTION - LOCATION: LOCATION: CHEST

## 2022-09-19 ASSESSMENT — PAIN - FUNCTIONAL ASSESSMENT
PAIN_FUNCTIONAL_ASSESSMENT: 0-10
PAIN_FUNCTIONAL_ASSESSMENT: NONE - DENIES PAIN

## 2022-09-19 NOTE — ED NOTES
Discharge instructions reviewed with patient and questions answered. Skin warm and dry, color normal for ethnicity. Remains alert and cooperative. Denies further questions or concerns at this time.       Katherine Cook RN  09/19/22 9098

## 2022-09-19 NOTE — ED PROVIDER NOTES
[facility]  eMERGENCY dEPARTMENT eNCOUnter             Mauro Parnell 21 COMPLAINT    Chief Complaint   Patient presents with    Chest Pain       Nurses Notes reviewed and I agree except as noted in the HPI. HPI    Humera Velásquez is a 24 y.o. female who presents stating that she woke up at about 630 this morning and noted that she had pain in the left side of her chest, going into her left arm. It went on \"for about 40 minutes \". Now the pain is gone. When she had the pain, it was not made better or worse by breathing. No preceding symptoms. She states that now, she feels fine. This is happened a few times to her in the past.  She has never had any kind of evaluation for it. She denies any recent illness, no cough or chest congestion. She is not on birth control or hormones, no history of blood clots. She denies any calf pain. No recent chest wall injury. REVIEW OF SYSTEMS      Review of Systems   Constitutional:  Negative for diaphoresis, fever and malaise/fatigue. HENT:  Negative for congestion, ear pain and sore throat. Respiratory:  Negative for cough, shortness of breath and wheezing. Cardiovascular:  Positive for chest pain. Negative for palpitations and leg swelling. Gastrointestinal:  Negative for abdominal pain, heartburn and nausea. Musculoskeletal: Negative. Skin:  Negative for rash. Neurological: Negative. All other systems reviewed and are negative. PAST MEDICAL HISTORY     has no past medical history on file. SURGICAL HISTORY     has a past surgical history that includes laparoscopic appendectomy (N/A, 8/11/2017). CURRENT MEDICATIONS    Previous Medications    No medications on file       ALLERGIES    is allergic to bactrim [sulfamethoxazole-trimethoprim] and ciprofloxacin hcl. FAMILY HISTORY    She indicated that her mother is alive. She indicated that her father is alive.  She indicated that the status of her maternal grandmother is unknown. She indicated that the status of her maternal grandfather is unknown. She indicated that the status of her paternal grandmother is unknown. She indicated that the status of her paternal grandfather is unknown.   family history includes Cancer in her paternal grandfather; Diabetes in her paternal grandmother; High Blood Pressure in her maternal grandfather, maternal grandmother, and paternal grandmother; Kidney Disease in her paternal grandfather. SOCIAL HISTORY     reports that she has never smoked. She has never used smokeless tobacco. She reports that she does not drink alcohol and does not use drugs. PHYSICAL EXAM       INITIAL VITALS: /71   Pulse 93   Temp 98.3 °F (36.8 °C) (Oral)   Resp 16   Ht 5' 6\" (1.676 m)   Wt 215 lb (97.5 kg)   SpO2 99%   BMI 34.70 kg/m²      Physical Exam  Vitals and nursing note reviewed. Constitutional:       General: She is not in acute distress. Appearance: She is not ill-appearing. HENT:      Nose: Nose normal.      Mouth/Throat:      Mouth: Mucous membranes are moist.      Pharynx: No oropharyngeal exudate or posterior oropharyngeal erythema. Eyes:      Conjunctiva/sclera: Conjunctivae normal.      Pupils: Pupils are equal, round, and reactive to light. Cardiovascular:      Rate and Rhythm: Normal rate and regular rhythm. Heart sounds: No murmur heard. Pulmonary:      Effort: Pulmonary effort is normal. No respiratory distress. Breath sounds: Normal breath sounds. No wheezing. Chest:      Chest wall: No tenderness. Abdominal:      General: Bowel sounds are normal.      Palpations: Abdomen is soft. There is no mass. Tenderness: no abdominal tenderness There is no guarding. Musculoskeletal:         General: No swelling or tenderness. Cervical back: Neck supple. No tenderness. Right lower leg: No edema. Left lower leg: No edema.    Lymphadenopathy:      Cervical: No cervical adenopathy. Skin:     General: Skin is warm and dry. Findings: No rash. Neurological:      General: No focal deficit present. Mental Status: She is alert and oriented to person, place, and time. Psychiatric:         Behavior: Behavior normal.            DIAGNOSTIC RESULTS    EKG     Normal sinus rhythm, sinus arrhythmia, otherwise normal axes, normal intervals, normal EKG. RADIOLOGY:    XR CHEST (2 VW)   Final Result   Stable radiographic appearance of the chest. No evidence of an acute process. **This report has been created using voice recognition software. It may contain minor errors which are inherent in voice recognition technology. **      Final report electronically signed by Dr. Raj Mcdonough on 9/19/2022 8:52 AM              LABS:     Labs Reviewed   COMPREHENSIVE METABOLIC PANEL - Abnormal; Notable for the following components:       Result Value    AST 14 (*)     All other components within normal limits   GLOMERULAR FILTRATION RATE, ESTIMATED - Abnormal; Notable for the following components:    GFR, Estimated 81 (*)     All other components within normal limits   CBC WITH AUTO DIFFERENTIAL   TROPONIN   ANION GAP     Heart score is 1. Vitals:    Vitals:    09/19/22 0812 09/19/22 0830 09/19/22 0856   BP: 102/70 92/78 105/71   Pulse: 63 67 93   Resp: 18 16 16   Temp: 98.3 °F (36.8 °C)     TempSrc: Oral     SpO2: 99% 99% 99%   Weight: 215 lb (97.5 kg)     Height: 5' 6\" (1.676 m)         EMERGENCY DEPARTMENT COURSE:    Oral aspirin given. No further chest pain. Test results and plan of care discussed with the patient. She will follow-up with her own physician. She does have some tenderness over chest wall over the left anterior chest.  I have asked her to take ibuprofen 600 mg every 6 hours as needed for that pain. FINAL IMPRESSION      1.  Chest wall pain        DISPOSITION/PLAN    DISPOSITION Decision To Discharge 09/19/2022 09:11:03 AM      PATIENT REFERRED TO:    KIRSTEN Tyson - CNP  [address] [facility] [address]  [phone]      As needed    DISCHARGE MEDICATIONS:    New Prescriptions    No medications on file          (Please note that portions of this note were completed with a voice recognition program.  Efforts were made to edit the dictations but occasionally words are mis-transcribed.)       Jhon Portillo MD  09/19/22 1347

## 2022-09-19 NOTE — DISCHARGE INSTRUCTIONS
Ibuprofen 600 mg every 6 hours as needed for pain. Follow-up with your doctor if symptoms persist.  You may need further testing.

## 2022-09-19 NOTE — Clinical Note
Pat Yanes was seen and treated in our emergency department on 9/19/2022. She may return to work on 09/20/2022. If you have any questions or concerns, please don't hesitate to call.       Nilam Gates MD

## 2022-09-20 LAB
EKG ATRIAL RATE: 61 BPM
EKG P AXIS: 54 DEGREES
EKG P-R INTERVAL: 142 MS
EKG Q-T INTERVAL: 418 MS
EKG QRS DURATION: 90 MS
EKG QTC CALCULATION (BAZETT): 420 MS
EKG R AXIS: 57 DEGREES
EKG T AXIS: 49 DEGREES
EKG VENTRICULAR RATE: 61 BPM

## 2022-09-20 PROCEDURE — 93010 ELECTROCARDIOGRAM REPORT: CPT | Performed by: INTERNAL MEDICINE

## 2024-12-12 ENCOUNTER — HOSPITAL ENCOUNTER (EMERGENCY)
Age: 29
Discharge: HOME | End: 2024-12-12
Payer: COMMERCIAL

## 2024-12-12 VITALS
SYSTOLIC BLOOD PRESSURE: 106 MMHG | HEART RATE: 65 BPM | TEMPERATURE: 98.5 F | RESPIRATION RATE: 16 BRPM | OXYGEN SATURATION: 99 % | DIASTOLIC BLOOD PRESSURE: 66 MMHG

## 2024-12-12 DIAGNOSIS — H66.002 NON-RECURRENT ACUTE SUPPURATIVE OTITIS MEDIA OF LEFT EAR WITHOUT SPONTANEOUS RUPTURE OF TYMPANIC MEMBRANE: Primary | ICD-10-CM

## 2024-12-12 DIAGNOSIS — R05.1 ACUTE COUGH: ICD-10-CM

## 2024-12-12 PROCEDURE — 99213 OFFICE O/P EST LOW 20 MIN: CPT

## 2024-12-12 PROCEDURE — 99203 OFFICE O/P NEW LOW 30 MIN: CPT

## 2024-12-12 RX ORDER — FLUTICASONE PROPIONATE 50 MCG
1 SPRAY, SUSPENSION (ML) NASAL DAILY PRN
Qty: 16 G | Refills: 0 | Status: SHIPPED | OUTPATIENT
Start: 2024-12-12

## 2024-12-12 RX ORDER — PREDNISONE 20 MG/1
20 TABLET ORAL 2 TIMES DAILY
Qty: 10 TABLET | Refills: 0 | Status: SHIPPED | OUTPATIENT
Start: 2024-12-12 | End: 2024-12-17

## 2024-12-12 ASSESSMENT — ENCOUNTER SYMPTOMS: COUGH: 1

## 2024-12-13 NOTE — DISCHARGE INSTRUCTIONS
Antibiotics  Steroids  Flonase  Otc cough and cold medication  Tylenol and motrin  ER if symptoms worsen.

## 2024-12-13 NOTE — ED PROVIDER NOTES
[facility] URGENT [facility]ARE  Urgent Care Encounter       CHIEF COMPLAINT       Chief Complaint   Patient presents with    Cough     \" Over a week\" and left ear pressure        Nurses Notes reviewed and I agree except as noted in the HPI.  HISTORY OF PRESENT ILLNESS   Marybeth Campbell is a 29 y.o. female who presents with complaints of a cough for over a week. Pt reports she has tried over the counter cough and cold medication without relieve. Pt reports now she has left ear pressure.     The history is provided by the patient.       REVIEW OF SYSTEMS     Review of Systems   HENT:  Positive for ear pain.    Respiratory:  Positive for cough.        PAST MEDICAL HISTORY         Diagnosis Date    S/P laparoscopic appendectomy        SURGICALHISTORY     Patient  has a past surgical history that includes laparoscopic appendectomy (N/A, 8/11/2017).    CURRENT MEDICATIONS       Previous Medications    No medications on file       ALLERGIES     Patient is is allergic to sulfamethoxazole-trimethoprim and ciprofloxacin hcl.    Patients   Immunization History   Administered Date(s) Administered    COVID-19, PFIZER PURPLE top, DILUTE for use, (age 12 y+), 30mcg/0.3mL 12/22/2021, 01/12/2022    DTaP vaccine 1997, 1997, 01/20/1998, 08/11/1998    DTaP, DAPTACEL, (age 6w-6y), IM, 0.5mL 06/13/2002    Hep A, HAVRIX, VAQTA, (age 12m-18y), IM, 0.5mL 07/27/2015, 03/18/2020    Hep B, ENGERIX-B, RECOMBIVAX-HB, (age Birth - 19y), IM, 0.5mL 01/25/1999, 03/08/1999, 08/23/1999    Hib vaccine 1997, 1997, 01/20/1998, 07/07/1998    MMR, PRIORIX, M-M-R II, (age 12m+), SC, 0.5mL 07/07/1998, 06/13/2002    Meningococcal ACWY, MENACTRA (MenACWY-D), (age 9m-55y), IM, 0.5mL 07/27/2015    Meningococcal B, TRUMENBA, (age 10y-25y), IM, 0.5mL 03/18/2020    Polio OPV 07/07/1998    Poliovirus, IPOL, (age 6w+), SC/IM, 0.5mL 1997, 1997, 06/13/2002    TDaP, ADACEL (age 10y-64y), BOOSTRIX (age 10y+), IM, 0.5mL

## 2025-06-17 ENCOUNTER — HOSPITAL ENCOUNTER (EMERGENCY)
Age: 30
Discharge: HOME | End: 2025-06-17
Payer: COMMERCIAL

## 2025-06-17 VITALS
WEIGHT: 240 LBS | DIASTOLIC BLOOD PRESSURE: 88 MMHG | SYSTOLIC BLOOD PRESSURE: 121 MMHG | TEMPERATURE: 98.7 F | BODY MASS INDEX: 38.74 KG/M2 | RESPIRATION RATE: 20 BRPM | OXYGEN SATURATION: 97 % | HEART RATE: 76 BPM

## 2025-06-17 DIAGNOSIS — R35.0 URINARY FREQUENCY: Primary | ICD-10-CM

## 2025-06-17 LAB
BILIRUB UR STRIP.AUTO-MCNC: NEGATIVE MG/DL
CHARACTER UR: CLEAR
COLOR, UA: YELLOW
GLUCOSE UR QL STRIP.AUTO: NEGATIVE MG/DL
HCG UR QL: NEGATIVE
KETONES UR QL STRIP.AUTO: NEGATIVE
NITRITE UR QL STRIP.AUTO: NEGATIVE
PH UR STRIP.AUTO: 7 [PH] (ref 5–9)
PROT UR STRIP.AUTO-MCNC: NEGATIVE MG/DL
RBC #/AREA URNS HPF: NEGATIVE /[HPF]
SP GR UR STRIP.AUTO: 1.02 (ref 1–1.03)
UROBILINOGEN, URINE: 1 EU/DL (ref 0.2–1)
WBC #/AREA URNS HPF: NEGATIVE /[HPF]

## 2025-06-17 PROCEDURE — 84703 CHORIONIC GONADOTROPIN ASSAY: CPT

## 2025-06-17 PROCEDURE — 99213 OFFICE O/P EST LOW 20 MIN: CPT

## 2025-06-17 PROCEDURE — 81003 URINALYSIS AUTO W/O SCOPE: CPT

## 2025-06-17 ASSESSMENT — ENCOUNTER SYMPTOMS
BACK PAIN: 0
ABDOMINAL PAIN: 0

## 2025-06-17 ASSESSMENT — PAIN - FUNCTIONAL ASSESSMENT: PAIN_FUNCTIONAL_ASSESSMENT: NONE - DENIES PAIN

## 2025-06-17 NOTE — ED PROVIDER NOTES
[facility] URGENT CARE  Urgent Care Encounter      CHIEF COMPLAINT       Chief Complaint   Patient presents with    Urinary Frequency       Nurses Notes reviewed and I agree except as noted in the HPI.  HISTORY OF PRESENT ILLNESS   Marybeth Campbell is a 30 y.o. female who presents to urgent care with complaints of urinary frequency and burning with urination.  Patient reports symptoms started approximately 6 days ago.  Patient denies vaginal odor or vaginal itching.  Denies discharge.  Patient reports history of urinary tract infections and reports she would like to assure she does not have one currently.  Denies back pain, abdominal pain, fevers.    REVIEW OF SYSTEMS     Review of Systems   Constitutional:  Negative for fever.   Gastrointestinal:  Negative for abdominal pain.   Genitourinary:  Positive for dysuria and frequency.   Musculoskeletal:  Negative for back pain.   Neurological:  Negative for seizures.       PAST MEDICAL HISTORY         Diagnosis Date    S/P laparoscopic appendectomy        SURGICAL HISTORY     Patient  has a past surgical history that includes laparoscopic appendectomy (N/A, 8/11/2017).    CURRENT MEDICATIONS     There are no discharge medications for this patient.      ALLERGIES     Patient is is allergic to sulfamethoxazole-trimethoprim and ciprofloxacin hcl.    FAMILY HISTORY     Patient'sfamily history includes Cancer in her paternal grandfather; Diabetes in her paternal grandmother; High Blood Pressure in her maternal grandfather, maternal grandmother, and paternal grandmother; Kidney Disease in her paternal grandfather.    SOCIAL HISTORY     Patient  reports that she has never smoked. She has never used smokeless tobacco. She reports that she does not drink alcohol and does not use drugs.    PHYSICAL EXAM     ED TRIAGE VITALS  BP: 121/88, Temp: 98.7 °F (37.1 °C), Pulse: 76, Respirations: 20, SpO2: 97 %  Physical Exam  Vitals and nursing note reviewed.   Constitutional:

## 2025-06-17 NOTE — ED TRIAGE NOTES
Patient to room with c/o urinary frequency beginning six days ago . Denies vaginal drainage, odor, or itching. Denies exposure to STIs.

## (undated) DEVICE — APPLIER CLP M L L11.4IN DIA10MM ENDOSCP ROT MULT FOR LIG

## (undated) DEVICE — UNIVERSAL MONOPOLAR LAPAROSCOPIC CABLE 10FT, 4MM PIN CONNECTOR: Brand: CONMED

## (undated) DEVICE — CONMED DISPOSABLE BIPOLAR CABLE, 10' (3.05M): Brand: CONMED

## (undated) DEVICE — PAD,SANITARY,11 IN,MAXI,W/WINGS,N-STRL: Brand: MEDLINE

## (undated) DEVICE — GOWN,SIRUS,NON REINFRCD,LARGE,SET IN SL: Brand: MEDLINE

## (undated) DEVICE — BLADE LARYNSCP SZ 3 ENH DIR INTUB GLIDESCOPE MCGRATH MAC

## (undated) DEVICE — SOLUTION IV 1000ML 0.9% SOD CHL PH 5 INJ USP VIAFLX PLAS

## (undated) DEVICE — GLOVE ORANGE PI 7 1/2   MSG9075

## (undated) DEVICE — CORE TRUMPET FOR SINGLE SOLUTION BAG: Brand: CORE DYNAMICS

## (undated) DEVICE — MEDI-VAC YANKAUER SUCTION HANDLE W/BULBOUS TIP: Brand: CARDINAL HEALTH

## (undated) DEVICE — TROCAR: Brand: KII SHIELDED BLADED ACCESS SYSTEM

## (undated) DEVICE — STAPLER ENDOSCP 45MM L34CM STD NAT ARTC LIN CUT ECHELON FLX

## (undated) DEVICE — BAG 3X6 DETACH NYL SPEC

## (undated) DEVICE — BLADE CLIPPER GEN PURP NS

## (undated) DEVICE — INTENDED FOR TISSUE SEPARATION, AND OTHER PROCEDURES THAT REQUIRE A SHARP SURGICAL BLADE TO PUNCTURE OR CUT.: Brand: BARD-PARKER ® CARBON RIB-BACK BLADES

## (undated) DEVICE — GLOVE SURG SZ 65 THK91MIL LTX FREE SYN POLYISOPRENE

## (undated) DEVICE — INSUFFLATION NEEDLE TO ESTABLISH PNEUMOPERITONEUM.: Brand: INSUFFLATION NEEDLE

## (undated) DEVICE — RELOAD STPL L45MM H15 35MM REG TISS BLU 6 ROW B FRM NAT

## (undated) DEVICE — COVER ARMBRD W13XL28.5IN IMPERV BLU FOR OP RM

## (undated) DEVICE — 2000CC GUARDIAN II: Brand: GUARDIAN

## (undated) DEVICE — EXCEL 10FT (3.05 M) INSUFFLATION TUBING SET WITH 0.1 MICRON FILTER: Brand: EXCEL

## (undated) DEVICE — GENERAL LAPAROSCOPY PACK-LF: Brand: MEDLINE INDUSTRIES, INC.

## (undated) DEVICE — ROYAL SILK SURGICAL GOWN, XXL: Brand: CONVERTORS

## (undated) DEVICE — DISCONTINUED USE 399249 CS RELOAD ECHELON WHITE 45MM

## (undated) DEVICE — TRAY CATH 16FR F INCLUDE LUB DRNGE BG STATLOK STBL DEV